# Patient Record
(demographics unavailable — no encounter records)

---

## 2024-11-07 NOTE — HISTORY OF PRESENT ILLNESS
[5] : 5 [3] : 3 [Dull/Aching] : dull/aching [Constant] : constant [Heat] : heat [Walking] : walking [Stairs] : stairs [de-identified] : 05/28/24: 39YO M HERE FOR LEFT KNEE EVALUATION. HERE TO DISCUSS POSSIBLE TKA. IS A PATIENT OF DR SANCHEZ - HAS TRIED GEL INJECTIONS IN THE PAST - LAST EUFLEXXA INJECTION DONE ON 4/24/24. HE HAD RECENT MRI DONE DUE TO WORSENING PAIN AND STOPPED EUFLEXXA SERIES.   7.2.24 PATIENT HERE FOR LEFT KNEE PAIN.  DOS: 6.19.24. PATIENT STATES HIS KNEE IS STIFF AND SWOLLEN  7.25.24 PATIENT HERE FOR LEFT KNEE. DOS: 6.19.24 PATIENT STATES IMPROVEMENT BUT STILL FEELS STIFFNESS. PATIENT STATES PHYSICAL THERAPY IS HELPING  8.29.24 PATIENT HERE FOR LEFT KNEE. DOS: 6.19.24. PATIENT STATES HE STILL HAS SWELLING  11.7.24 PATIENT HERE FOR LEFT KNEE. DOS: 6.19.24. [] : no [de-identified] : XR, MRI

## 2024-11-07 NOTE — PHYSICAL EXAM
[Left] : left knee [] : no sign of infection [TWNoteComboBox7] : flexion 95 degrees [de-identified] : extension 0 degrees

## 2024-11-07 NOTE — ASSESSMENT
[FreeTextEntry1] : S/P LT TKA 6/19/24: NO F/C/S. DOING WELL. XRAYS REVIEWED WITH COMPONENTS WELL FIXED. NO SIGNS OF INFECTION. GOOD LIGAMENT BALANCE ON EXAM. WE AGAIN DISCUSSED ABX PPX FOR DENTAL PROCEDURES FOR AT LEAST 2 YEARS FROM SURGERY. QUESTIONS ANSWERED. HE IS OOW TD.   AMOX 500MG RX FOR DENTAL PROCEDURE. TAKE 4 PILLS ONE HOUR PRIOR TO DENTAL PROCEDURE.  MEDICATION USE DISCUSSED. DISCUSSED CONTINUING MOBIC 15MG TO BE TAKEN ONCE A DAY AS NEEDED FOR PAIN. THIS WAS REFILLED.

## 2025-06-19 NOTE — ASSESSMENT
[FreeTextEntry1] : S/P LT TKA 6/19/24: NO F/C/S. DOING WELL. XRAYS REVIEWED WITH COMPONENTS WELL FIXED. NO SIGNS OF INFECTION. GOOD LIGAMENT BALANCE ON EXAM. WE AGAIN DISCUSSED ABX PPX FOR DENTAL PROCEDURES FOR AT LEAST 2 YEARS FROM SURGERY. QUESTIONS ANSWERED. HE IS OOW TD.   XRAYS STABLE TODAY. QUESTIONS ANSWERED, PT REASSURED.   MAY CALL FOR PT IF NEEDED  MOBIC 7.5MG SENT FOR PRN US DICLOFENAC TOPICAL

## 2025-06-19 NOTE — HISTORY OF PRESENT ILLNESS
[Dull/Aching] : dull/aching [Constant] : constant [Heat] : heat [Walking] : walking [Stairs] : stairs [6] : 6 [2] : 2 [Shooting] : shooting [de-identified] :   06.19.25: PATIENT HERE FOR LEFT KNEE. PATIENT IS ONE YEAR POST LEFT TKA.  [] : no [de-identified] : XR, MRI

## 2025-06-19 NOTE — PHYSICAL EXAM
[Left] : left knee [5___] : hamstring 5[unfilled]/5 [] : no sign of infection [TWNoteComboBox7] : flexion 120 degrees [de-identified] : extension 0 degrees